# Patient Record
Sex: MALE | Race: BLACK OR AFRICAN AMERICAN | Employment: FULL TIME | URBAN - METROPOLITAN AREA
[De-identification: names, ages, dates, MRNs, and addresses within clinical notes are randomized per-mention and may not be internally consistent; named-entity substitution may affect disease eponyms.]

---

## 2019-10-07 ENCOUNTER — HOSPITAL ENCOUNTER (EMERGENCY)
Age: 29
Discharge: HOME | End: 2019-10-07
Payer: COMMERCIAL

## 2019-10-07 VITALS
RESPIRATION RATE: 16 BRPM | DIASTOLIC BLOOD PRESSURE: 80 MMHG | TEMPERATURE: 97.6 F | BODY MASS INDEX: 28.1 KG/M2 | SYSTOLIC BLOOD PRESSURE: 129 MMHG | HEIGHT: 77 IN | OXYGEN SATURATION: 98 % | WEIGHT: 238 LBS | HEART RATE: 69 BPM

## 2019-10-07 DIAGNOSIS — K08.89 PAIN, DENTAL: Primary | ICD-10-CM

## 2019-10-07 PROCEDURE — 6370000000 HC RX 637 (ALT 250 FOR IP): Performed by: PHYSICIAN ASSISTANT

## 2019-10-07 PROCEDURE — 99283 EMERGENCY DEPT VISIT LOW MDM: CPT

## 2019-10-07 RX ORDER — AMOXICILLIN 500 MG/1
500 CAPSULE ORAL 3 TIMES DAILY
COMMUNITY
End: 2019-10-07

## 2019-10-07 RX ORDER — CLINDAMYCIN HYDROCHLORIDE 150 MG/1
300 CAPSULE ORAL ONCE
Status: COMPLETED | OUTPATIENT
Start: 2019-10-07 | End: 2019-10-07

## 2019-10-07 RX ORDER — CLINDAMYCIN HYDROCHLORIDE 150 MG/1
300 CAPSULE ORAL 3 TIMES DAILY
Qty: 60 CAPSULE | Refills: 0 | Status: SHIPPED | OUTPATIENT
Start: 2019-10-07 | End: 2019-10-17

## 2019-10-07 RX ORDER — OXYCODONE HYDROCHLORIDE AND ACETAMINOPHEN 5; 325 MG/1; MG/1
1 TABLET ORAL EVERY 6 HOURS PRN
Qty: 6 TABLET | Refills: 0 | Status: SHIPPED | OUTPATIENT
Start: 2019-10-07 | End: 2019-10-12

## 2019-10-07 RX ORDER — OXYCODONE HYDROCHLORIDE AND ACETAMINOPHEN 5; 325 MG/1; MG/1
1 TABLET ORAL ONCE
Status: COMPLETED | OUTPATIENT
Start: 2019-10-07 | End: 2019-10-07

## 2019-10-07 RX ADMIN — CLINDAMYCIN HYDROCHLORIDE 300 MG: 150 CAPSULE ORAL at 01:22

## 2019-10-07 RX ADMIN — OXYCODONE HYDROCHLORIDE AND ACETAMINOPHEN 1 TABLET: 5; 325 TABLET ORAL at 01:23

## 2019-10-07 ASSESSMENT — PAIN DESCRIPTION - FREQUENCY: FREQUENCY: CONTINUOUS

## 2019-10-07 ASSESSMENT — PAIN DESCRIPTION - PAIN TYPE: TYPE: ACUTE PAIN

## 2019-10-07 ASSESSMENT — PAIN DESCRIPTION - ORIENTATION: ORIENTATION: RIGHT;UPPER

## 2019-10-07 ASSESSMENT — PAIN SCALES - GENERAL
PAINLEVEL_OUTOF10: 10
PAINLEVEL_OUTOF10: 10

## 2019-10-07 ASSESSMENT — PAIN DESCRIPTION - LOCATION: LOCATION: TEETH

## 2019-10-07 ASSESSMENT — PAIN DESCRIPTION - DESCRIPTORS: DESCRIPTORS: THROBBING

## 2019-12-10 ENCOUNTER — APPOINTMENT (OUTPATIENT)
Dept: CT IMAGING | Age: 29
End: 2019-12-10
Payer: COMMERCIAL

## 2019-12-10 ENCOUNTER — HOSPITAL ENCOUNTER (EMERGENCY)
Age: 29
Discharge: HOME | End: 2019-12-11
Attending: EMERGENCY MEDICINE
Payer: COMMERCIAL

## 2019-12-10 ENCOUNTER — APPOINTMENT (OUTPATIENT)
Dept: GENERAL RADIOLOGY | Age: 29
End: 2019-12-10
Payer: COMMERCIAL

## 2019-12-10 DIAGNOSIS — J01.40 SUBACUTE PANSINUSITIS: Primary | ICD-10-CM

## 2019-12-10 LAB
ANION GAP SERPL CALCULATED.3IONS-SCNC: 9 MMOL/L (ref 7–16)
BASOPHILS ABSOLUTE: 0.04 E9/L (ref 0–0.2)
BASOPHILS RELATIVE PERCENT: 0.6 % (ref 0–2)
BUN BLDV-MCNC: 9 MG/DL (ref 6–20)
CALCIUM SERPL-MCNC: 9.5 MG/DL (ref 8.6–10.2)
CHLORIDE BLD-SCNC: 102 MMOL/L (ref 98–107)
CK MB: 3.2 NG/ML (ref 0–7.7)
CO2: 30 MMOL/L (ref 22–29)
CREAT SERPL-MCNC: 1.2 MG/DL (ref 0.7–1.2)
EOSINOPHILS ABSOLUTE: 0.14 E9/L (ref 0.05–0.5)
EOSINOPHILS RELATIVE PERCENT: 2.1 % (ref 0–6)
GFR AFRICAN AMERICAN: >60
GFR NON-AFRICAN AMERICAN: >60 ML/MIN/1.73
GLUCOSE BLD-MCNC: 90 MG/DL (ref 74–99)
HCT VFR BLD CALC: 44.6 % (ref 37–54)
HEMOGLOBIN: 14.8 G/DL (ref 12.5–16.5)
IMMATURE GRANULOCYTES #: 0.01 E9/L
IMMATURE GRANULOCYTES %: 0.1 % (ref 0–5)
LYMPHOCYTES ABSOLUTE: 3.67 E9/L (ref 1.5–4)
LYMPHOCYTES RELATIVE PERCENT: 54.2 % (ref 20–42)
MCH RBC QN AUTO: 30.2 PG (ref 26–35)
MCHC RBC AUTO-ENTMCNC: 33.2 % (ref 32–34.5)
MCV RBC AUTO: 91 FL (ref 80–99.9)
MONOCYTES ABSOLUTE: 0.53 E9/L (ref 0.1–0.95)
MONOCYTES RELATIVE PERCENT: 7.8 % (ref 2–12)
NEUTROPHILS ABSOLUTE: 2.38 E9/L (ref 1.8–7.3)
NEUTROPHILS RELATIVE PERCENT: 35.2 % (ref 43–80)
PDW BLD-RTO: 12 FL (ref 11.5–15)
PLATELET # BLD: 280 E9/L (ref 130–450)
PMV BLD AUTO: 9.2 FL (ref 7–12)
POTASSIUM SERPL-SCNC: 4.3 MMOL/L (ref 3.5–5)
RBC # BLD: 4.9 E12/L (ref 3.8–5.8)
SODIUM BLD-SCNC: 141 MMOL/L (ref 132–146)
TOTAL CK: 359 U/L (ref 20–200)
TROPONIN: <0.01 NG/ML (ref 0–0.03)
WBC # BLD: 6.8 E9/L (ref 4.5–11.5)

## 2019-12-10 PROCEDURE — 93005 ELECTROCARDIOGRAM TRACING: CPT | Performed by: EMERGENCY MEDICINE

## 2019-12-10 PROCEDURE — 85025 COMPLETE CBC W/AUTO DIFF WBC: CPT

## 2019-12-10 PROCEDURE — 84484 ASSAY OF TROPONIN QUANT: CPT

## 2019-12-10 PROCEDURE — 70450 CT HEAD/BRAIN W/O DYE: CPT

## 2019-12-10 PROCEDURE — 99285 EMERGENCY DEPT VISIT HI MDM: CPT

## 2019-12-10 PROCEDURE — 2580000003 HC RX 258: Performed by: EMERGENCY MEDICINE

## 2019-12-10 PROCEDURE — 80048 BASIC METABOLIC PNL TOTAL CA: CPT

## 2019-12-10 PROCEDURE — 82550 ASSAY OF CK (CPK): CPT

## 2019-12-10 PROCEDURE — 82553 CREATINE MB FRACTION: CPT

## 2019-12-10 PROCEDURE — 6370000000 HC RX 637 (ALT 250 FOR IP): Performed by: EMERGENCY MEDICINE

## 2019-12-10 PROCEDURE — 36415 COLL VENOUS BLD VENIPUNCTURE: CPT

## 2019-12-10 PROCEDURE — 71045 X-RAY EXAM CHEST 1 VIEW: CPT

## 2019-12-10 PROCEDURE — 70486 CT MAXILLOFACIAL W/O DYE: CPT

## 2019-12-10 RX ORDER — KETOROLAC TROMETHAMINE 10 MG/1
10 TABLET, FILM COATED ORAL EVERY 8 HOURS PRN
Qty: 15 TABLET | Refills: 0 | Status: SHIPPED | OUTPATIENT
Start: 2019-12-10

## 2019-12-10 RX ORDER — 0.9 % SODIUM CHLORIDE 0.9 %
1000 INTRAVENOUS SOLUTION INTRAVENOUS ONCE
Status: COMPLETED | OUTPATIENT
Start: 2019-12-10 | End: 2019-12-11

## 2019-12-10 RX ORDER — ASPIRIN 325 MG
325 TABLET ORAL ONCE
Status: COMPLETED | OUTPATIENT
Start: 2019-12-10 | End: 2019-12-10

## 2019-12-10 RX ORDER — FLUTICASONE PROPIONATE 50 MCG
2 SPRAY, SUSPENSION (ML) NASAL DAILY
Qty: 1 BOTTLE | Refills: 5 | Status: SHIPPED | OUTPATIENT
Start: 2019-12-10 | End: 2022-03-01 | Stop reason: SDUPTHER

## 2019-12-10 RX ADMIN — ASPIRIN 325 MG: 325 TABLET ORAL at 23:05

## 2019-12-10 RX ADMIN — SODIUM CHLORIDE 1000 ML: 9 INJECTION, SOLUTION INTRAVENOUS at 23:06

## 2019-12-10 ASSESSMENT — PAIN DESCRIPTION - LOCATION: LOCATION: HEAD

## 2019-12-10 ASSESSMENT — PAIN SCALES - GENERAL: PAINLEVEL_OUTOF10: 10

## 2019-12-10 ASSESSMENT — PAIN DESCRIPTION - ORIENTATION: ORIENTATION: LEFT

## 2019-12-10 ASSESSMENT — PAIN DESCRIPTION - FREQUENCY: FREQUENCY: CONTINUOUS

## 2019-12-10 ASSESSMENT — PAIN DESCRIPTION - DESCRIPTORS: DESCRIPTORS: THROBBING

## 2019-12-10 ASSESSMENT — PAIN DESCRIPTION - PAIN TYPE: TYPE: ACUTE PAIN

## 2019-12-11 VITALS
DIASTOLIC BLOOD PRESSURE: 66 MMHG | TEMPERATURE: 98.3 F | HEART RATE: 84 BPM | SYSTOLIC BLOOD PRESSURE: 140 MMHG | WEIGHT: 250 LBS | OXYGEN SATURATION: 98 % | RESPIRATION RATE: 18 BRPM | BODY MASS INDEX: 29.52 KG/M2 | HEIGHT: 77 IN

## 2019-12-11 LAB
EKG ATRIAL RATE: 67 BPM
EKG P AXIS: 54 DEGREES
EKG P-R INTERVAL: 172 MS
EKG Q-T INTERVAL: 368 MS
EKG QRS DURATION: 94 MS
EKG QTC CALCULATION (BAZETT): 388 MS
EKG R AXIS: 77 DEGREES
EKG T AXIS: 59 DEGREES
EKG VENTRICULAR RATE: 67 BPM

## 2022-03-01 ENCOUNTER — APPOINTMENT (OUTPATIENT)
Dept: GENERAL RADIOLOGY | Age: 32
End: 2022-03-01
Payer: COMMERCIAL

## 2022-03-01 ENCOUNTER — APPOINTMENT (OUTPATIENT)
Dept: ULTRASOUND IMAGING | Age: 32
End: 2022-03-01
Payer: COMMERCIAL

## 2022-03-01 ENCOUNTER — HOSPITAL ENCOUNTER (EMERGENCY)
Age: 32
Discharge: HOME | End: 2022-03-01
Attending: EMERGENCY MEDICINE
Payer: COMMERCIAL

## 2022-03-01 VITALS
OXYGEN SATURATION: 98 % | HEIGHT: 77 IN | WEIGHT: 255 LBS | SYSTOLIC BLOOD PRESSURE: 140 MMHG | TEMPERATURE: 98.9 F | HEART RATE: 70 BPM | BODY MASS INDEX: 30.11 KG/M2 | DIASTOLIC BLOOD PRESSURE: 70 MMHG | RESPIRATION RATE: 16 BRPM

## 2022-03-01 DIAGNOSIS — J06.9 ACUTE UPPER RESPIRATORY INFECTION: ICD-10-CM

## 2022-03-01 DIAGNOSIS — M25.562 CHRONIC PAIN OF BOTH KNEES: Primary | ICD-10-CM

## 2022-03-01 DIAGNOSIS — M25.561 CHRONIC PAIN OF BOTH KNEES: Primary | ICD-10-CM

## 2022-03-01 DIAGNOSIS — G89.29 CHRONIC PAIN OF BOTH KNEES: Primary | ICD-10-CM

## 2022-03-01 LAB
BASOPHILS ABSOLUTE: 0.08 E9/L (ref 0–0.2)
BASOPHILS RELATIVE PERCENT: 1.5 % (ref 0–2)
EKG ATRIAL RATE: 72 BPM
EKG P AXIS: 49 DEGREES
EKG P-R INTERVAL: 174 MS
EKG Q-T INTERVAL: 338 MS
EKG QRS DURATION: 86 MS
EKG QTC CALCULATION (BAZETT): 370 MS
EKG R AXIS: 63 DEGREES
EKG T AXIS: 47 DEGREES
EKG VENTRICULAR RATE: 72 BPM
EOSINOPHILS ABSOLUTE: 0.16 E9/L (ref 0.05–0.5)
EOSINOPHILS RELATIVE PERCENT: 3 % (ref 0–6)
HCT VFR BLD CALC: 46.7 % (ref 37–54)
HEMOGLOBIN: 15.4 G/DL (ref 12.5–16.5)
IMMATURE GRANULOCYTES #: 0.01 E9/L
IMMATURE GRANULOCYTES %: 0.2 % (ref 0–5)
LYMPHOCYTES ABSOLUTE: 2.76 E9/L (ref 1.5–4)
LYMPHOCYTES RELATIVE PERCENT: 52.3 % (ref 20–42)
MCH RBC QN AUTO: 29.4 PG (ref 26–35)
MCHC RBC AUTO-ENTMCNC: 33 % (ref 32–34.5)
MCV RBC AUTO: 89.1 FL (ref 80–99.9)
MONOCYTES ABSOLUTE: 0.5 E9/L (ref 0.1–0.95)
MONOCYTES RELATIVE PERCENT: 9.5 % (ref 2–12)
NEUTROPHILS ABSOLUTE: 1.77 E9/L (ref 1.8–7.3)
NEUTROPHILS RELATIVE PERCENT: 33.5 % (ref 43–80)
PDW BLD-RTO: 12.2 FL (ref 11.5–15)
PLATELET # BLD: 284 E9/L (ref 130–450)
PMV BLD AUTO: 9.1 FL (ref 7–12)
RBC # BLD: 5.24 E12/L (ref 3.8–5.8)
TOTAL CK: 248 U/L (ref 20–200)
TROPONIN, HIGH SENSITIVITY: 6 NG/L (ref 0–11)
WBC # BLD: 5.3 E9/L (ref 4.5–11.5)

## 2022-03-01 PROCEDURE — 36415 COLL VENOUS BLD VENIPUNCTURE: CPT

## 2022-03-01 PROCEDURE — 82550 ASSAY OF CK (CPK): CPT

## 2022-03-01 PROCEDURE — 71046 X-RAY EXAM CHEST 2 VIEWS: CPT

## 2022-03-01 PROCEDURE — 84484 ASSAY OF TROPONIN QUANT: CPT

## 2022-03-01 PROCEDURE — 93005 ELECTROCARDIOGRAM TRACING: CPT | Performed by: EMERGENCY MEDICINE

## 2022-03-01 PROCEDURE — 85025 COMPLETE CBC W/AUTO DIFF WBC: CPT

## 2022-03-01 PROCEDURE — 99283 EMERGENCY DEPT VISIT LOW MDM: CPT

## 2022-03-01 PROCEDURE — 93010 ELECTROCARDIOGRAM REPORT: CPT | Performed by: INTERNAL MEDICINE

## 2022-03-01 PROCEDURE — 93970 EXTREMITY STUDY: CPT

## 2022-03-01 RX ORDER — FLUTICASONE PROPIONATE 50 MCG
2 SPRAY, SUSPENSION (ML) NASAL DAILY
Qty: 1 EACH | Refills: 0 | Status: SHIPPED | OUTPATIENT
Start: 2022-03-01

## 2022-03-01 ASSESSMENT — PAIN DESCRIPTION - LOCATION: LOCATION: BACK

## 2022-03-01 ASSESSMENT — PAIN SCALES - GENERAL: PAINLEVEL_OUTOF10: 6

## 2022-03-01 ASSESSMENT — PAIN - FUNCTIONAL ASSESSMENT: PAIN_FUNCTIONAL_ASSESSMENT: 0-10

## 2022-03-01 NOTE — LETTER
5 [facility] Emergency Department  [address] [place] [address]  Phone: [phone]      March 1, 2022    Patient: Rad Rasheed   YOB: 1990   Date of Visit: 3/1/2022       To Whom It May Concen:    Rad Rasheed was seen and treated in our emergency department on 3/1/2022. He may return to work on 3/3/2022.       Sincerely,       Alicja Marshall RN         Signature:__________________________________

## 2022-03-01 NOTE — ED PROVIDER NOTES
HPI:  3/1/22,   Time: 11:49 AM THANH Lewis is a 34 y.o. male presenting to the ED for bilateral knee pain nasal congestion, patient also complains of low back pain. The symptoms started in December after he had Covid. Congestion has been ongoing since November of last year. He has not seen a physician for any of these complaints. He has no chest pain he denies shortness of breath he has no hemoptysis. , beginning several months ago. The complaint has been persistent, moderate in severity, and worsened by nothing. No fever or chills. ROS:   Pertinent positives and negatives are stated within HPI, all other systems reviewed and are negative.  --------------------------------------------- PAST HISTORY ---------------------------------------------  Past Medical History:  has a past medical history of Eczema. Past Surgical History:  has no past surgical history on file. Social History:  reports that he has been smoking. He has a 1.00 pack-year smoking history. He has never used smokeless tobacco. He reports that he does not drink alcohol and does not use drugs. Family History: family history includes High Blood Pressure in his maternal grandfather and mother. The patients home medications have been reviewed. Allergies: Patient has no known allergies.     -------------------------------------------------- RESULTS -------------------------------------------------  All laboratory and radiology results have been personally reviewed by myself   LABS:  Results for orders placed or performed during the hospital encounter of 03/01/22   CBC with Auto Differential   Result Value Ref Range    WBC 5.3 4.5 - 11.5 E9/L    RBC 5.24 3.80 - 5.80 E12/L    Hemoglobin 15.4 12.5 - 16.5 g/dL    Hematocrit 46.7 37.0 - 54.0 %    MCV 89.1 80.0 - 99.9 fL    MCH 29.4 26.0 - 35.0 pg    MCHC 33.0 32.0 - 34.5 %    RDW 12.2 11.5 - 15.0 fL    Platelets 846 954 - 529 E9/L    MPV 9.1 7.0 - 12.0 fL    Neutrophils % 33.5 (L) 43.0 - 80.0 %    Immature Granulocytes % 0.2 0.0 - 5.0 %    Lymphocytes % 52.3 (H) 20.0 - 42.0 %    Monocytes % 9.5 2.0 - 12.0 %    Eosinophils % 3.0 0.0 - 6.0 %    Basophils % 1.5 0.0 - 2.0 %    Neutrophils Absolute 1.77 (L) 1.80 - 7.30 E9/L    Immature Granulocytes # 0.01 E9/L    Lymphocytes Absolute 2.76 1.50 - 4.00 E9/L    Monocytes Absolute 0.50 0.10 - 0.95 E9/L    Eosinophils Absolute 0.16 0.05 - 0.50 E9/L    Basophils Absolute 0.08 0.00 - 0.20 E9/L   CK   Result Value Ref Range    Total  (H) 20 - 200 U/L   Troponin   Result Value Ref Range    Troponin, High Sensitivity 6 0 - 11 ng/L   EKG 12 Lead   Result Value Ref Range    Ventricular Rate 72 BPM    Atrial Rate 72 BPM    P-R Interval 174 ms    QRS Duration 86 ms    Q-T Interval 338 ms    QTc Calculation (Bazett) 370 ms    P Axis 49 degrees    R Axis 63 degrees    T Axis 47 degrees       RADIOLOGY:  Interpreted by Radiologist.  XR CHEST (2 VW)   Final Result   No acute cardiopulmonary process. US DUP LOWER EXTREMITIES BILATERAL VENOUS   Final Result   No evidence of DVT in either lower extremity.             ------------------------- NURSING NOTES AND VITALS REVIEWED ---------------------------   The nursing notes within the ED encounter and vital signs as below have been reviewed. BP (!) 147/65   Pulse 70   Temp 98.9 °F (37.2 °C) (Oral)   Resp 16   Ht 6' 5\" (1.956 m)   Wt 255 lb (115.7 kg)   SpO2 98%   BMI 30.24 kg/m²   Oxygen Saturation Interpretation: Normal      ---------------------------------------------------PHYSICAL EXAM--------------------------------------      Constitutional/General: Alert and oriented x3, well appearing, non toxic in NAD  Head: NC/AT  Eyes: PERRL, EOMI  Mouth: Oropharynx clear, handling secretions, no trismus  Neck: Supple, full ROM, no meningeal signs  Pulmonary: Lungs clear to auscultation bilaterally, no wheezes, rales, or rhonchi.  Not in respiratory distress  Cardiovascular:  Regular rate and rhythm, no murmurs, gallops, or rubs. 2+ distal pulses  Abdomen: Soft, non tender, non distended,   Extremities: Moves all extremities x 4. Warm and well perfused  Skin: warm and dry without rash  Neurologic: GCS 15, no focal deficits no abd  Psych: Normal Affect      ------------------------------ ED COURSE/MEDICAL DECISION MAKING----------------------  Medications - No data to display      Medical Decision Making:    Patient's labs are not worrisome. Ultrasounds are negative for bilateral lower extremity DVTs. Patient CPK slightly elevated. Troponin is less than 6. Recommend he follow-up with his PCP for his ongoing complaints. Counseling: The emergency provider has spoken with the patient and discussed todays results, in addition to providing specific details for the plan of care and counseling regarding the diagnosis and prognosis. Questions are answered at this time and they are agreeable with the plan.      --------------------------------- IMPRESSION AND DISPOSITION ---------------------------------    IMPRESSION  1. Chronic pain of both knees    2.  Acute upper respiratory infection        DISPOSITION  Disposition: Discharge to home  Patient condition is stable                Mag Linda DO  03/01/22 [id number]

## 2023-06-24 ENCOUNTER — APPOINTMENT (OUTPATIENT)
Dept: CT IMAGING | Age: 33
End: 2023-06-24
Payer: COMMERCIAL

## 2023-06-24 ENCOUNTER — HOSPITAL ENCOUNTER (EMERGENCY)
Age: 33
Discharge: HOME | End: 2023-06-24
Payer: COMMERCIAL

## 2023-06-24 VITALS
BODY MASS INDEX: 27.87 KG/M2 | RESPIRATION RATE: 15 BRPM | WEIGHT: 236 LBS | DIASTOLIC BLOOD PRESSURE: 88 MMHG | TEMPERATURE: 98.8 F | OXYGEN SATURATION: 99 % | SYSTOLIC BLOOD PRESSURE: 146 MMHG | HEIGHT: 77 IN | HEART RATE: 90 BPM

## 2023-06-24 DIAGNOSIS — S39.012A STRAIN OF LUMBAR REGION, INITIAL ENCOUNTER: Primary | ICD-10-CM

## 2023-06-24 PROCEDURE — 99284 EMERGENCY DEPT VISIT MOD MDM: CPT

## 2023-06-24 PROCEDURE — 72131 CT LUMBAR SPINE W/O DYE: CPT

## 2023-06-24 PROCEDURE — 6370000000 HC RX 637 (ALT 250 FOR IP): Performed by: NURSE PRACTITIONER

## 2023-06-24 RX ORDER — IBUPROFEN 800 MG/1
800 TABLET ORAL ONCE
Status: COMPLETED | OUTPATIENT
Start: 2023-06-24 | End: 2023-06-24

## 2023-06-24 RX ORDER — METHOCARBAMOL 750 MG/1
750 TABLET, FILM COATED ORAL ONCE
Status: COMPLETED | OUTPATIENT
Start: 2023-06-24 | End: 2023-06-24

## 2023-06-24 RX ORDER — PREDNISONE 20 MG/1
40 TABLET ORAL DAILY
Qty: 10 TABLET | Refills: 0 | Status: SHIPPED | OUTPATIENT
Start: 2023-06-24 | End: 2023-06-29

## 2023-06-24 RX ORDER — ACETAMINOPHEN 500 MG
1000 TABLET ORAL ONCE
Status: COMPLETED | OUTPATIENT
Start: 2023-06-24 | End: 2023-06-24

## 2023-06-24 RX ORDER — IBUPROFEN 600 MG/1
600 TABLET ORAL 4 TIMES DAILY PRN
Qty: 40 TABLET | Refills: 0 | Status: SHIPPED | OUTPATIENT
Start: 2023-06-24

## 2023-06-24 RX ORDER — METHOCARBAMOL 750 MG/1
750 TABLET, FILM COATED ORAL 4 TIMES DAILY
Qty: 40 TABLET | Refills: 0 | Status: SHIPPED | OUTPATIENT
Start: 2023-06-24 | End: 2023-07-04

## 2023-06-24 RX ADMIN — ACETAMINOPHEN 1000 MG: 500 TABLET ORAL at 11:27

## 2023-06-24 RX ADMIN — IBUPROFEN 800 MG: 800 TABLET, FILM COATED ORAL at 11:27

## 2023-06-24 RX ADMIN — METHOCARBAMOL TABLETS 750 MG: 750 TABLET, COATED ORAL at 11:27

## 2023-06-24 NOTE — ED PROVIDER NOTES
I discussed discharge plan with him. I prescribed him 60 mg ibuprofen, 750 mg of Robaxin and 5 days of prednisone. I advised him to take these medications as directed and to follow-up with his PCP within the next 3 to 5 days. If he did not have access to a PCP, I provided him with follow-up information to establish care. Told him he can apply heat to his low back for 20 minutes at a time. Instructed return to the emergency department for any worsening symptoms such as loss of bowel or bladder control, numbness or tingling between his legs, inability to ambulate secondary to lower extremity weakness. All questions were answered. Patient was discharged from the emergency department. Plan of Care/Counseling:  ANJALI Samson CNP reviewed today's visit with the patient in addition to providing specific details for the plan of care and counseling regarding the diagnosis and prognosis. Questions are answered at this time and are agreeable with the plan. Assessment      1. Strain of lumbar region, initial encounter      Plan   Discharged home. Patient condition is stable    New Medications     New Prescriptions    IBUPROFEN (ADVIL;MOTRIN) 600 MG TABLET    Take 1 tablet by mouth 4 times daily as needed for Pain    METHOCARBAMOL (ROBAXIN-750) 750 MG TABLET    Take 1 tablet by mouth 4 times daily for 10 days    PREDNISONE (DELTASONE) 20 MG TABLET    Take 2 tablets by mouth daily for 5 days     Electronically signed by ANJALI Samson CNP   DD: 6/24/23  **This report was transcribed using voice recognition software. Every effort was made to ensure accuracy; however, inadvertent computerized transcription errors may be present.   END OF ED PROVIDER NOTE       ANJALI Samson CNP  06/24/23 2629

## 2023-06-24 NOTE — DISCHARGE INSTRUCTIONS
TAKE THE MEDICATIONS AS PRESCRIBED. ROBAXIN AND IBUPROFEN AS NEEDED. TAKE THE STEROIDS FOR THE NEXT 5 DAYS. APPLY HEAT TO YOUR LOW BACK FOR 20 MINUTES ON AND 20 MINUTES OFF. FOLLOW UP WITH YOUR PCP. IF YOU DO NOT HAVE ONE, I ATTACHED YOU INFORMATION TO ESTABLISH CARE. RETURN TO THE ER FOR WORSENING PAIN, LOSS OF BOWEL/BLADDER CONTROL, LOWER EXTREMITY NUMBNESS.

## 2023-11-21 ENCOUNTER — HOSPITAL ENCOUNTER (EMERGENCY)
Age: 33
Discharge: HOME | End: 2023-11-21
Payer: COMMERCIAL

## 2023-11-21 VITALS
WEIGHT: 226 LBS | SYSTOLIC BLOOD PRESSURE: 144 MMHG | OXYGEN SATURATION: 100 % | BODY MASS INDEX: 26.8 KG/M2 | TEMPERATURE: 99.3 F | DIASTOLIC BLOOD PRESSURE: 76 MMHG | HEART RATE: 82 BPM | RESPIRATION RATE: 15 BRPM

## 2023-11-21 DIAGNOSIS — L02.419 AXILLARY ABSCESS: Primary | ICD-10-CM

## 2023-11-21 PROCEDURE — 6370000000 HC RX 637 (ALT 250 FOR IP): Performed by: PHYSICIAN ASSISTANT

## 2023-11-21 PROCEDURE — 10060 I&D ABSCESS SIMPLE/SINGLE: CPT

## 2023-11-21 PROCEDURE — 99283 EMERGENCY DEPT VISIT LOW MDM: CPT

## 2023-11-21 RX ORDER — DOXYCYCLINE HYCLATE 100 MG
100 TABLET ORAL 2 TIMES DAILY
Qty: 20 TABLET | Refills: 0 | Status: SHIPPED | OUTPATIENT
Start: 2023-11-21 | End: 2023-12-01

## 2023-11-21 RX ORDER — KETOROLAC TROMETHAMINE 30 MG/ML
30 INJECTION, SOLUTION INTRAMUSCULAR; INTRAVENOUS ONCE
Status: DISCONTINUED | OUTPATIENT
Start: 2023-11-21 | End: 2023-11-21 | Stop reason: HOSPADM

## 2023-11-21 RX ORDER — DOXYCYCLINE HYCLATE 100 MG/1
100 CAPSULE ORAL ONCE
Status: COMPLETED | OUTPATIENT
Start: 2023-11-21 | End: 2023-11-21

## 2023-11-21 RX ADMIN — DOXYCYCLINE HYCLATE 100 MG: 100 CAPSULE ORAL at 20:06

## 2023-11-21 ASSESSMENT — PAIN - FUNCTIONAL ASSESSMENT: PAIN_FUNCTIONAL_ASSESSMENT: 0-10

## 2023-11-21 ASSESSMENT — PAIN SCALES - GENERAL: PAINLEVEL_OUTOF10: 4

## 2023-11-21 ASSESSMENT — PAIN DESCRIPTION - LOCATION: LOCATION: ARM

## 2023-11-21 ASSESSMENT — PAIN DESCRIPTION - ORIENTATION: ORIENTATION: RIGHT

## 2023-11-21 NOTE — ED NOTES
Department of Emergency Medicine  FIRST PROVIDER TRIAGE NOTE             Independent MLP           11/21/23  5:59 PM EST    Date of Encounter: 11/21/23   MRN: [id number]      HPI: Evelina Garrett is a 28 y.o. male who presents to the ED for Abscess (Under right armpit. Noticed it about a week ago. Has been getting bigger. )     HAS HAD TO HAVE ONE DRAINED IN THE PAST. FEELS CHILLS, NOT SURE IF IT IS BECAUSE OF THE WEATHER. DENIES FEVERS. ROS: Negative for cp or sob. PE: Gen Appearance/Constitutional: alert  HEENT: NC/NT. PERRLA,  Airway patent. Initial Plan of Care: All treatment areas with department are currently occupied. Plan to order/Initiate the following while awaiting opening in ED.   Initiate Treatment-Testing, Proceed toTreatment Area When Bed Available for ED Attending/MLP to Continue Care    Electronically signed by ANJALI Koenig CNP   DD: 11/21/23      ANJALI Koenig CNP  11/21/23 1800

## 2023-11-22 NOTE — ED PROVIDER NOTES
Independent NIGEL Visit.  S    ED  Encounter Note  Admit Date/RoomTime: 2023  7:34 PM  ED Room:   NAME: Bessy England  : 1990  MRN: [id number]  PCP: No primary care provider on file. CHIEF COMPLAINT     Abscess (Under right armpit. Noticed it about a week ago. Has been getting bigger. )    HISTORY OF PRESENT ILLNESS        Bessy England is a 28 y.o. male who presents to the ED by private vehicle for abscess right axilla, beginning a few day(s) ago. The complaint has been persistent and are moderate in severity. The patient presents today with painful abscess right axilla. Onset few days ago. No drainage. States he has had this many times in past.   Would like to F/U with dermatologist.         REVIEW OF SYSTEMS     Pertinent positives and negatives are stated within HPI, all other systems reviewed and are negative. Past Medical History:  has a past medical history of Eczema. Surgical History:  has no past surgical history on file. Social History:  reports that he has been smoking. He has a 1.00 pack-year smoking history. He has never used smokeless tobacco. He reports that he does not drink alcohol and does not use drugs. Family History: family history includes High Blood Pressure in his maternal grandfather and mother. Allergies: Patient has no known allergies. CURRENT MEDICATIONS       Previous Medications    FLUTICASONE (FLONASE) 50 MCG/ACT NASAL SPRAY    2 sprays by Each Nostril route daily    IBUPROFEN (ADVIL;MOTRIN) 600 MG TABLET    Take 1 tablet by mouth 4 times daily as needed for Pain    KETOROLAC (TORADOL) 10 MG TABLET    Take 1 tablet by mouth every 8 hours as needed for Pain       SCREENINGS               [facility] Assessment  BP: (!) 144/76  Pulse: 82       PHYSICAL EXAM   Oxygen Saturation Interpretation: Normal on room air analysis.         ED Triage Vitals   BP Temp Temp Source Pulse Respirations SpO2

## 2024-11-24 ENCOUNTER — HOSPITAL ENCOUNTER (EMERGENCY)
Age: 34
Discharge: HOME | End: 2024-11-24
Attending: STUDENT IN AN ORGANIZED HEALTH CARE EDUCATION/TRAINING PROGRAM
Payer: COMMERCIAL

## 2024-11-24 VITALS
HEIGHT: 77 IN | HEART RATE: 77 BPM | BODY MASS INDEX: 26.8 KG/M2 | WEIGHT: 227 LBS | RESPIRATION RATE: 18 BRPM | SYSTOLIC BLOOD PRESSURE: 135 MMHG | DIASTOLIC BLOOD PRESSURE: 81 MMHG | OXYGEN SATURATION: 100 % | TEMPERATURE: 98 F

## 2024-11-24 DIAGNOSIS — J02.9 SORE THROAT: ICD-10-CM

## 2024-11-24 DIAGNOSIS — R42 DIZZINESS: ICD-10-CM

## 2024-11-24 DIAGNOSIS — J06.9 URI WITH COUGH AND CONGESTION: Primary | ICD-10-CM

## 2024-11-24 LAB
INFLUENZA A BY PCR: NOT DETECTED
INFLUENZA B BY PCR: NOT DETECTED
SARS-COV-2 RDRP RESP QL NAA+PROBE: NOT DETECTED
SPECIMEN DESCRIPTION: NORMAL
SPECIMEN SOURCE: NORMAL
STREP A, MOLECULAR: NEGATIVE

## 2024-11-24 PROCEDURE — 6370000000 HC RX 637 (ALT 250 FOR IP): Performed by: STUDENT IN AN ORGANIZED HEALTH CARE EDUCATION/TRAINING PROGRAM

## 2024-11-24 PROCEDURE — 87502 INFLUENZA DNA AMP PROBE: CPT

## 2024-11-24 PROCEDURE — 99283 EMERGENCY DEPT VISIT LOW MDM: CPT

## 2024-11-24 PROCEDURE — 87651 STREP A DNA AMP PROBE: CPT

## 2024-11-24 PROCEDURE — 87635 SARS-COV-2 COVID-19 AMP PRB: CPT

## 2024-11-24 RX ORDER — PREDNISONE 20 MG/1
20 TABLET ORAL 2 TIMES DAILY
Qty: 10 TABLET | Refills: 0 | Status: SHIPPED | OUTPATIENT
Start: 2024-11-24 | End: 2024-11-29

## 2024-11-24 RX ORDER — AZITHROMYCIN 250 MG/1
TABLET, FILM COATED ORAL
Qty: 1 PACKET | Refills: 0 | Status: SHIPPED | OUTPATIENT
Start: 2024-11-24 | End: 2024-11-28

## 2024-11-24 RX ORDER — GUAIFENESIN/DEXTROMETHORPHAN 100-10MG/5
5 SYRUP ORAL EVERY 8 HOURS PRN
Qty: 120 ML | Refills: 0 | Status: SHIPPED | OUTPATIENT
Start: 2024-11-24

## 2024-11-24 RX ORDER — MECLIZINE HCL 12.5 MG 12.5 MG/1
25 TABLET ORAL ONCE
Status: COMPLETED | OUTPATIENT
Start: 2024-11-24 | End: 2024-11-24

## 2024-11-24 RX ORDER — PREDNISONE 20 MG/1
20 TABLET ORAL DAILY
Status: DISCONTINUED | OUTPATIENT
Start: 2024-11-24 | End: 2024-11-24 | Stop reason: HOSPADM

## 2024-11-24 RX ORDER — MECLIZINE HYDROCHLORIDE 25 MG/1
25 TABLET ORAL EVERY 8 HOURS PRN
Qty: 15 TABLET | Refills: 0 | Status: SHIPPED | OUTPATIENT
Start: 2024-11-24

## 2024-11-24 RX ORDER — CETIRIZINE HYDROCHLORIDE 10 MG/1
10 TABLET ORAL DAILY
Qty: 30 TABLET | Refills: 0 | Status: SHIPPED | OUTPATIENT
Start: 2024-11-24

## 2024-11-24 RX ADMIN — MECLIZINE 25 MG: 12.5 TABLET ORAL at 17:52

## 2024-11-24 RX ADMIN — PREDNISONE 20 MG: 20 TABLET ORAL at 17:52

## 2024-11-24 NOTE — DISCHARGE INSTRUCTIONS
Please return to the ER for any new or worsening symptoms  If prescribed, please be sure to  your prescriptions from the pharmacy  Please follow-up with Primary care provider and Ear, nose, throat as instructed

## 2024-11-24 NOTE — ED PROVIDER NOTES
[facility] EMERGENCY DEPARTMENT  EMERGENCY DEPARTMENT ENCOUNTER        Pt Name: Keegan Aj  MRN: [id number]  Birthdate 1990  Date of evaluation: 11/24/2024  Provider: Rosey Sierra DO  PCP: No primary care provider on file.  Note Started: 6:18 PM EST 11/24/24    CHIEF COMPLAINT       Chief Complaint   Patient presents with    Dizziness     X2 days    Pharyngitis    Cough     Kids are sick at home       HISTORY OF PRESENT ILLNESS: 1 or more Elements     Limitations to history : None    Keegan Aj is a 34 y.o. male who presents to the emergency department for evaluation of sore throat, episodic room spinning dizziness depending on position changes, red itchy eyes, mild coughing.  Says the cough and sore throat have resolved and he is mostly here because the dizziness is bothering him.  Especially if he turns his head to the right or leans forward.  He denies any neck pain or stiffness.  No fevers or chills.  No chest pain palpitations or shortness of breath.  No drooling, difficulty speaking or swallowing.  No neck pain or stiffness.  Denies any headaches numbness or weakness anywhere or changes in vision.  Denies any abdominal pain nausea vomiting or diarrhea.  No abnormal urinary symptoms.  No falls or injuries.  His kids at home are sick with similar symptoms.      Nursing Notes were all reviewed and agreed with or any disagreements were addressed in the HPI.      REVIEW OF EXTERNAL NOTE :       Reviewed documentation from office visit with family medicine on 6/9/2011.    Chart Review/External Note Review    Last Echo reviewed by Me:  No results found for: \"LVEF\", \"LVEFMODE\"        Controlled Substance Monitoring:    Acute and Chronic Pain Monitoring:        No data to display                      REVIEW OF SYSTEMS :      Review of Systems   Constitutional:  Negative for chills and fever.   HENT:  Positive for sore throat. Negative for trouble swallowing.    Eyes:

## 2024-11-26 ASSESSMENT — ENCOUNTER SYMPTOMS
STRIDOR: 0
COUGH: 1
SORE THROAT: 1
VOMITING: 0
DIARRHEA: 0
SHORTNESS OF BREATH: 0
CONSTIPATION: 0
TROUBLE SWALLOWING: 0
NAUSEA: 0
ABDOMINAL PAIN: 0